# Patient Record
Sex: MALE | Race: WHITE | Employment: FULL TIME | URBAN - METROPOLITAN AREA
[De-identification: names, ages, dates, MRNs, and addresses within clinical notes are randomized per-mention and may not be internally consistent; named-entity substitution may affect disease eponyms.]

---

## 2019-01-29 ENCOUNTER — HOSPITAL ENCOUNTER (EMERGENCY)
Age: 24
Discharge: HOME | End: 2019-01-29
Attending: EMERGENCY MEDICINE
Payer: COMMERCIAL

## 2019-01-29 VITALS
DIASTOLIC BLOOD PRESSURE: 70 MMHG | OXYGEN SATURATION: 100 % | HEART RATE: 70 BPM | SYSTOLIC BLOOD PRESSURE: 144 MMHG | BODY MASS INDEX: 20.92 KG/M2 | WEIGHT: 150 LBS | TEMPERATURE: 98.7 F | RESPIRATION RATE: 18 BRPM

## 2019-01-29 DIAGNOSIS — T15.91XA FOREIGN BODY OF RIGHT EYE, INITIAL ENCOUNTER: Primary | ICD-10-CM

## 2019-01-29 PROCEDURE — 99283 EMERGENCY DEPT VISIT LOW MDM: CPT | Performed by: EMERGENCY MEDICINE

## 2019-01-29 RX ORDER — GENTAMICIN SULFATE 3 MG/ML
1 SOLUTION/ DROPS OPHTHALMIC EVERY 4 HOURS
Qty: 5 ML | Refills: 0 | Status: SHIPPED | OUTPATIENT
Start: 2019-01-29

## 2019-01-29 RX ORDER — TETRACAINE HYDROCHLORIDE 5 MG/ML
1 SOLUTION OPHTHALMIC
Status: DISCONTINUED | OUTPATIENT
Start: 2019-01-29 | End: 2019-01-29 | Stop reason: HOSPADM

## 2019-01-30 NOTE — ED NOTES
I have reviewed discharge instructions with the patient. The patient verbalized understanding. Patient left ED via Discharge Method: ambulatory to Home with father. Opportunity for questions and clarification provided. Patient given 1 scripts. To continue your aftercare when you leave the hospital, you may receive an automated call from our care team to check in on how you are doing. This is a free service and part of our promise to provide the best care and service to meet your aftercare needs.  If you have questions, or wish to unsubscribe from this service please call [phone]. Thank you for Choosing our [facility] Emergency Department.

## 2019-01-30 NOTE — DISCHARGE INSTRUCTIONS
Patient Education        Feeling of an Object in the Eye: Care Instructions  Your Care Instructions    Sometimes people feel like there is something in their eye. This is called a foreign body sensation. A doctor may not find anything wrong with your eye. If you had something very small in your eye, like a speck of dirt, tears may have washed it out. Or you may have a small scratch on the surface of the eye (cornea), which can make it feel as if something is still in your eye. The doctor will check your vision and examine your eye. Your eye may be numbed with drops. Sometimes a drop of colored fluid is put in the eye. This lets the doctor have a better view of the surface of the eye. You may get drops to put in your eye after you go home. Or you may just need to watch for a change in your symptoms. Follow-up care is a key part of your treatment and safety. Be sure to make and go to all appointments, and call your doctor if you are having problems. It's also a good idea to know your test results and keep a list of the medicines you take. How can you care for yourself at home? · Do not rub your eye. · If the doctor prescribed eyedrops or ointment, use them as directed. Be sure the dropper or bottle tip is clean. · To put in eyedrops or ointment:  ? Tilt your head back, and pull your lower eyelid down with one finger. ? Drop or squirt the medicine inside the lower lid. ? Close your eye for 30 to 60 seconds to let the drops or ointment move around. ? Do not touch the ointment or dropper tip to your eyelashes or any other surface. When should you call for help? Call your doctor now or seek immediate medical care if:    · You have new or worse eye pain.     · Light hurts your eye.     · You have new or worse redness in your eye.     · You have symptoms of an eye infection, such as:  ? Pus or thick discharge coming from the eye.  ? Redness or swelling around the eye.  ? A fever.     · You have vision changes.  Watch closely for changes in your health, and be sure to contact your doctor if:    · You do not get better as expected. Where can you learn more? Go to http://starla-fariha.info/. Enter Z938 in the search box to learn more about \"Feeling of an Object in the Eye: Care Instructions. \"  Current as of: September 23, 2018  Content Version: 11.9  © 9500-4359 [employer]. Care instructions adapted under license by [employer] (which disclaims liability or warranty for this information). If you have questions about a medical condition or this instruction, always ask your healthcare professional. [address] any warranty or liability for your use of this information.

## 2019-01-30 NOTE — ED PROVIDER NOTES
Patient presents with a foreign body sensation in his right eye that occurred while cutting wood with a jigsaw. He was not wearing safety glasses. He denies any vision loss and review of systems otherwise negative. He reports irrigating his eye with tap water. The history is provided by the patient. Foreign Body in [place] This is a new problem. The current episode started 1 to 2 hours ago. The problem occurs constantly. The problem has not changed since onset. The right eye is affected. The injury mechanism was a foreign body. The pain is at a severity of 5/10. The pain is moderate. There is no history of trauma to the eye. There is no known exposure to pink eye. He does not wear contacts. Associated symptoms include foreign body sensation and eye redness. He has tried water for the symptoms. The treatment provided no relief. History reviewed. No pertinent past medical history. History reviewed. No pertinent surgical history. History reviewed. No pertinent family history. Social History Socioeconomic History  Marital status: SINGLE Spouse name: Not on file  Number of children: Not on file  Years of education: Not on file  Highest education level: Not on file Social Needs  Financial resource strain: Not on file  Food insecurity - worry: Not on file  Food insecurity - inability: Not on file  Transportation needs - medical: Not on file  Transportation needs - non-medical: Not on file Occupational History  Not on file Tobacco Use  Smoking status: Never Smoker  Smokeless tobacco: Never Used Substance and Sexual Activity  Alcohol use: Yes  Drug use: No  
 Sexual activity: Not on file Other Topics Concern  Not on file Social History Narrative  Not on file ALLERGIES: Patient has no known allergies. Review of Systems Eyes: Positive for redness. All other systems reviewed and are negative. Vitals: 01/29/19 2002 BP: 144/70 Pulse: 70 Resp: 16 Temp: 99 °F (37.2 °C) SpO2: 98% Weight: 68 kg (150 lb) Physical Exam  
Constitutional: He is oriented to person, place, and time. He appears well-developed and well-nourished. No distress. HENT:  
Head: Normocephalic and atraumatic. Eyes: EOM are normal. Pupils are equal, round, and reactive to light. Right eye exhibits no discharge. Left eye exhibits no discharge. No scleral icterus. There is injection of the conjunctiva on the right eye. Eversion of lids demonstrates no foreign body. Fluorescein staining demonstrates no uptake to suggest corneal abrasion. An examination under slit lamp also demonstrates no visible foreign bodies or abrasion. Anterior chamber is deep and no cells or flare noted Neurological: He is alert and oriented to person, place, and time. Skin: Skin is warm and dry. Capillary refill takes less than 2 seconds. He is not diaphoretic. Psychiatric: He has a normal mood and affect. His behavior is normal.  
Nursing note and vitals reviewed. MDM Number of Diagnoses or Management Options Foreign body of right eye, initial encounter:  
Risk of Complications, Morbidity, and/or Mortality Presenting problems: low Diagnostic procedures: minimal 
Management options: low Patient Progress Patient progress: stable Procedures

## 2019-01-30 NOTE — ED NOTES
I have reviewed discharge instructions with the patient. The patient verbalized understanding. Patient left ED via Discharge Method: ambulatory to Home with father. Opportunity for questions and clarification provided. Patient given 1 scripts. To continue your aftercare when you leave the hospital, you may receive an automated call from our care team to check in on how you are doing. This is a free service and part of our promise to provide the best care and service to meet your aftercare needs.  If you have questions, or wish to unsubscribe from this service please call [phone]. Thank you for Choosing our [address] [place] [address] Emergency Department.